# Patient Record
(demographics unavailable — no encounter records)

---

## 2025-01-03 NOTE — REASON FOR VISIT
[Initial Evaluation] : an initial evaluation [FreeTextEntry1] : Worsening chronic GERD and intermittent dysphagia

## 2025-01-03 NOTE — HISTORY OF PRESENT ILLNESS
[de-identified] : 5 to 6 years ago.  Negative exam. [FreeTextEntry1] : More than 10 years ago.  Negative exam.

## 2025-01-03 NOTE — ASSESSMENT
[FreeTextEntry1] : Impression: Chronic GERD with intermittent esophageal dysphagia rule out reflux of Kennedy's esophagitis and/or possible esophageal stricture and/or neoplasm.  Recommendations: Patient was advised to take pantoprazole 40 mg in the morning before breakfast rather than at night before sleep and take famotidine 20 mg at bedtime and not in the morning pending upper endoscopy which was advised for further evaluation of the above.  The risk versus benefits of upper endoscopy and intravenous sedation, and alternative testing such as upper GI series, were individually explained to the patient today who appeared to understand all of the above and was agreeable to proceeding with upper endoscopy.  His ASA classification is 2 optimized for the proposed upper endoscopy.  A low-fat antireflux diet was also recommended and explained to both the patient and his wife who accompanied the patient today both of whom appeared to understand all of the above instructions, information, and management plan.

## 2025-01-03 NOTE — PHYSICAL EXAM
[Alert] : alert [Normal Voice/Communication] : normal voice/communication [Healthy Appearing] : healthy appearing [No Acute Distress] : no acute distress [Well Developed] : well developed [Well Nourished] : well nourished [Sclera] : the sclera and conjunctiva were normal [Hearing Threshold Finger Rub Not [place]] : hearing was normal [Normal Lips/Gums] : the lips and gums were normal [Oropharynx] : the oropharynx was normal [Normal Appearance] : the appearance of the neck was normal [No Neck Mass] : no neck mass was observed [No Respiratory Distress] : no respiratory distress [No Acc Muscle Use] : no accessory muscle use [Respiration, Rhythm And Depth] : normal respiratory rhythm and effort [Auscultation Breath Sounds / Voice Sounds] : lungs were clear to auscultation bilaterally [Heart Rate And Rhythm] : heart rate was normal and rhythm regular [Normal S1, S2] : normal S1 and S2 [Murmurs] : no murmurs [Bowel Sounds] : normal bowel sounds [Abdomen Tenderness] : non-tender [No Masses] : no abdominal mass palpated [Abdomen Soft] : soft [] : no hepatosplenomegaly [No CVA Tenderness] : no CVA  tenderness [Abnormal Walk] : normal gait [No Joint Swelling] : no joint swelling seen [Normal Color / Pigmentation] : normal skin color and pigmentation [Oriented To Time, Place, And Person] : oriented to person, place, and time [de-identified] : Deferred at this time.

## 2025-01-03 NOTE — REVIEW OF SYSTEMS
[Negative] : Heme/Lymph [As Noted in HPI] : as noted in HPI [Abdominal Pain] : no abdominal pain [Vomiting] : no vomiting [Constipation] : no constipation [Diarrhea] : no diarrhea [Heartburn] : heartburn [Melena (black stool)] : no melena [Bleeding] : no bleeding [Fecal Incontinence (soiling)] : no fecal incontinence [Bloating (gassiness)] : no bloating [FreeTextEntry7] : Dysphagia for solids